# Patient Record
Sex: FEMALE | Race: WHITE | Employment: FULL TIME | URBAN - METROPOLITAN AREA
[De-identification: names, ages, dates, MRNs, and addresses within clinical notes are randomized per-mention and may not be internally consistent; named-entity substitution may affect disease eponyms.]

---

## 2018-09-26 ENCOUNTER — LAB VISIT (OUTPATIENT)
Dept: LAB | Facility: HOSPITAL | Age: 58
End: 2018-09-26
Attending: ORTHOPAEDIC SURGERY
Payer: OTHER GOVERNMENT

## 2018-09-26 DIAGNOSIS — J44.9 COPD (CHRONIC OBSTRUCTIVE PULMONARY DISEASE): Primary | ICD-10-CM

## 2018-09-26 LAB
BASOPHILS # BLD AUTO: 0 K/UL
BASOPHILS NFR BLD: 0.4 %
DIFFERENTIAL METHOD: ABNORMAL
EOSINOPHIL # BLD AUTO: 0.2 K/UL
EOSINOPHIL NFR BLD: 3.4 %
ERYTHROCYTE [DISTWIDTH] IN BLOOD BY AUTOMATED COUNT: 17.8 %
HCT VFR BLD AUTO: 34.6 %
HGB BLD-MCNC: 10.5 G/DL
LYMPHOCYTES # BLD AUTO: 1.4 K/UL
LYMPHOCYTES NFR BLD: 20.9 %
MCH RBC QN AUTO: 23.3 PG
MCHC RBC AUTO-ENTMCNC: 30.3 G/DL
MCV RBC AUTO: 77 FL
MONOCYTES # BLD AUTO: 0.4 K/UL
MONOCYTES NFR BLD: 6.1 %
NEUTROPHILS # BLD AUTO: 4.6 K/UL
NEUTROPHILS NFR BLD: 69.2 %
PLATELET # BLD AUTO: 364 K/UL
PMV BLD AUTO: 6.6 FL
RBC # BLD AUTO: 4.51 M/UL
WBC # BLD AUTO: 6.6 K/UL

## 2018-09-26 PROCEDURE — 85025 COMPLETE CBC W/AUTO DIFF WBC: CPT

## 2018-09-26 PROCEDURE — 36415 COLL VENOUS BLD VENIPUNCTURE: CPT

## 2018-11-08 ENCOUNTER — TELEPHONE (OUTPATIENT)
Dept: TRANSPLANT | Facility: CLINIC | Age: 58
End: 2018-11-08

## 2018-11-08 NOTE — LETTER
2018    Iain Mccallum  [address]  [facility] [address]  Phone: [phone]  Fax: [phone]     Dear Dr. Iain Mccallum,     Patient:  Susanna Oropeza  MRN:  [id number]  :  1960    Thank you for referring Susanna Oropeza to our lung transplant program.  Upon reviewing the medical records provided to our office, we find that Susanna Oropeza is not a potential candidate for lung transplantation at [facility] due to her insurance.  She must [facility] within [facility].    Susanna Oropeza has the option of being referred by your office for lung transplant evaluation at another center.    Once again, we appreciate your referral to our center.  We look forward to working with you in the future.  If you have any questions or concerns regarding this decision, then please do not hesitate to contact me at [phone].    Sincerely,         Jacob Bailey MD   Director, Lung Transplantation   Pulmonary & Critical Care Medicine    [facility]  [address] [place] [id number]  [phone]

## 2018-11-08 NOTE — TELEPHONE ENCOUNTER
Received referral records.  Reviewed with Dr. Bailey.  He would like patient to be consulted.  Pt has a trach.  Awaiting response from Dr. Bailey regarding whether or not patient can do PFT's.      LM for Dr. Mccallum's nurse to send CT chest and Echo if done in the last 12 months.

## 2018-11-12 NOTE — TELEPHONE ENCOUNTER
Pt declined for lung transplant at [facility] because she needs to be referred within the [facility].  NOtified David with Dr. Mccallum's office.  She will discuss with Dr. Mccallum.  Notified patient's sister that patient has to be referred within the [facility] and that Dr. Mccallum's staff will discuss her options with her.  She verbalized understanding.